# Patient Record
Sex: MALE | Race: WHITE | URBAN - METROPOLITAN AREA
[De-identification: names, ages, dates, MRNs, and addresses within clinical notes are randomized per-mention and may not be internally consistent; named-entity substitution may affect disease eponyms.]

---

## 2017-12-18 ENCOUNTER — OFFICE VISIT (OUTPATIENT)
Dept: OPHTHALMOLOGY | Facility: CLINIC | Age: 27
End: 2017-12-18

## 2017-12-18 DIAGNOSIS — H52.03 HYPEROPIA OF BOTH EYES WITH ASTIGMATISM: ICD-10-CM

## 2017-12-18 DIAGNOSIS — H50.00 ESOTROPIA OF LEFT EYE: Primary | ICD-10-CM

## 2017-12-18 DIAGNOSIS — H52.203 HYPEROPIA OF BOTH EYES WITH ASTIGMATISM: ICD-10-CM

## 2017-12-18 DIAGNOSIS — H02.402 PTOSIS, LEFT EYELID: ICD-10-CM

## 2017-12-18 DIAGNOSIS — H53.002 AMBLYOPIA, LEFT EYE: ICD-10-CM

## 2017-12-18 PROCEDURE — 92014 COMPRE OPH EXAM EST PT 1/>: CPT | Performed by: OPHTHALMOLOGY

## 2017-12-18 PROCEDURE — 92015 DETERMINE REFRACTIVE STATE: CPT | Performed by: OPHTHALMOLOGY

## 2017-12-18 NOTE — PROGRESS NOTES
Monica Wynn is a 31year old male. HPI:     HPI     EP/ almost 28 yr old here for a complete exam. LDE 4/9/15; last seen on 12/21/15 with Hx of esotropia OS, hyperopia, astigmatism, amblyopia OS, ptosis ROGELIO and blepharitis.  Pt needs a vision form f Bunny Synephrine @ 10:59 AM            Strabismus Exam         LET 16                      Slit Lamp and Fundus Exam     External Exam       Right Left    External Normal Normal          Slit Lamp Exam       Right Left    Lids/Lashes Normal Ptosis, Blepharit

## 2017-12-18 NOTE — PATIENT INSTRUCTIONS
Hyperopia of both eyes with astigmatism  New glasses given. Amblyopia, left eye  Longstanding; no treatment . Continue glasses full time. Ptosis, left eyelid  No treatment. Esotropia of left eye  Stable; no treatment.   Vision report for mis

## 2018-12-21 ENCOUNTER — OFFICE VISIT (OUTPATIENT)
Dept: OPHTHALMOLOGY | Facility: CLINIC | Age: 28
End: 2018-12-21
Payer: MEDICARE

## 2018-12-21 DIAGNOSIS — H01.001 BLEPHARITIS OF UPPER EYELIDS OF BOTH EYES, UNSPECIFIED TYPE: ICD-10-CM

## 2018-12-21 DIAGNOSIS — H01.004 BLEPHARITIS OF UPPER EYELIDS OF BOTH EYES, UNSPECIFIED TYPE: ICD-10-CM

## 2018-12-21 DIAGNOSIS — H50.00 ESOTROPIA OF LEFT EYE: ICD-10-CM

## 2018-12-21 DIAGNOSIS — H52.03 HYPEROPIA OF BOTH EYES WITH ASTIGMATISM: ICD-10-CM

## 2018-12-21 DIAGNOSIS — H52.203 HYPEROPIA OF BOTH EYES WITH ASTIGMATISM: ICD-10-CM

## 2018-12-21 DIAGNOSIS — H02.402 PTOSIS, LEFT EYELID: Primary | ICD-10-CM

## 2018-12-21 PROCEDURE — 92014 COMPRE OPH EXAM EST PT 1/>: CPT | Performed by: OPHTHALMOLOGY

## 2018-12-21 PROCEDURE — 92015 DETERMINE REFRACTIVE STATE: CPT | Performed by: OPHTHALMOLOGY

## 2018-12-21 NOTE — PATIENT INSTRUCTIONS
Ptosis, left eyelid  Stable    Blepharitis, both eyes  Patient instructed to use lid hygiene  daily. Apply baby shampoo to warm washcloth and scrub eyelids gently with eyes closed, then rinse thoroughly.         Hyperopia of both eyes with astigmatism  New

## 2018-12-21 NOTE — ASSESSMENT & PLAN NOTE
Patient instructed to use lid hygiene  daily. Apply baby shampoo to warm washcloth and scrub eyelids gently with eyes closed, then rinse thoroughly.

## 2019-12-30 ENCOUNTER — OFFICE VISIT (OUTPATIENT)
Dept: OPHTHALMOLOGY | Facility: CLINIC | Age: 29
End: 2019-12-30
Payer: MEDICARE

## 2019-12-30 DIAGNOSIS — H52.03 HYPEROPIA OF BOTH EYES WITH ASTIGMATISM: ICD-10-CM

## 2019-12-30 DIAGNOSIS — H52.203 HYPEROPIA OF BOTH EYES WITH ASTIGMATISM: ICD-10-CM

## 2019-12-30 DIAGNOSIS — H50.00 ESOTROPIA OF LEFT EYE: ICD-10-CM

## 2019-12-30 DIAGNOSIS — H53.002 AMBLYOPIA, LEFT EYE: ICD-10-CM

## 2019-12-30 DIAGNOSIS — H02.402 PTOSIS, LEFT EYELID: Primary | ICD-10-CM

## 2019-12-30 DIAGNOSIS — H01.001 BLEPHARITIS OF UPPER EYELIDS OF BOTH EYES, UNSPECIFIED TYPE: ICD-10-CM

## 2019-12-30 DIAGNOSIS — H01.004 BLEPHARITIS OF UPPER EYELIDS OF BOTH EYES, UNSPECIFIED TYPE: ICD-10-CM

## 2019-12-30 PROCEDURE — 92015 DETERMINE REFRACTIVE STATE: CPT | Performed by: OPHTHALMOLOGY

## 2019-12-30 PROCEDURE — 92014 COMPRE OPH EXAM EST PT 1/>: CPT | Performed by: OPHTHALMOLOGY

## 2019-12-30 NOTE — PATIENT INSTRUCTIONS
Ptosis, left eyelid  Mild, stable. Hyperopia of both eyes with astigmatism  New glasses    Blepharitis, both eyes  Patient instructed to use lid hygiene  daily.   Apply baby shampoo to warm washcloth and scrub eyelids gently with eyes closed, then rinse

## 2019-12-30 NOTE — PROGRESS NOTES
Jazz Huston is a 26year old male. HPI:     HPI     EP/ 26 yr old here for a complete exam. LDE was 12/21/18 with Hx of Hx of esotropia OS, hyperopia, astigmatism, amblyopia OS, ptosis ROGELIO and blepharitis. History of cranial synostosis.  Had cranial Dilation     Both eyes:  1.0% Mydriacyl and 2.5% Bunny Synephrine @ 12:09 PM            Strabismus Exam     Distance Near Near +3DS N Bifocals    LET  LET'                Slit Lamp and Fundus Exam     External Exam       Right Left    External Normal Normal

## 2020-11-23 ENCOUNTER — TELEPHONE (OUTPATIENT)
Dept: OPHTHALMOLOGY | Facility: CLINIC | Age: 30
End: 2020-11-23

## 2020-11-23 NOTE — TELEPHONE ENCOUNTER
Pt has to go back to [id number] [facility] on 1/15/21 - and wont be able to leave after that - asking for appt before 1/15

## 2020-12-11 ENCOUNTER — OFFICE VISIT (OUTPATIENT)
Dept: OPHTHALMOLOGY | Facility: CLINIC | Age: 30
End: 2020-12-11
Payer: MEDICARE

## 2020-12-11 DIAGNOSIS — H52.203 HYPEROPIA OF BOTH EYES WITH ASTIGMATISM: ICD-10-CM

## 2020-12-11 DIAGNOSIS — H02.402 PTOSIS, LEFT EYELID: Primary | ICD-10-CM

## 2020-12-11 DIAGNOSIS — H50.00 ESOTROPIA OF LEFT EYE: ICD-10-CM

## 2020-12-11 DIAGNOSIS — H53.002 AMBLYOPIA, LEFT EYE: ICD-10-CM

## 2020-12-11 DIAGNOSIS — H52.03 HYPEROPIA OF BOTH EYES WITH ASTIGMATISM: ICD-10-CM

## 2020-12-11 PROCEDURE — 92015 DETERMINE REFRACTIVE STATE: CPT | Performed by: OPHTHALMOLOGY

## 2020-12-11 PROCEDURE — 92014 COMPRE OPH EXAM EST PT 1/>: CPT | Performed by: OPHTHALMOLOGY

## 2020-12-11 NOTE — PATIENT INSTRUCTIONS
Hyperopia of both eyes with astigmatism  New glasses    Amblyopia, left eye  Mild, stable. Ptosis, left eyelid  Will follow    Esotropia of left eye  Small angle.  stable

## 2020-12-11 NOTE — PROGRESS NOTES
Roxane Russell is a 26year old male. HPI:     HPI     EP/ 26 yr old here for a complete exam. LDE 12/30/19 with Hx of ROGELIO ptosis, hyperopia, astigmatism, esotropia, blepharitis and left amblyopia.  Pt denies any blurred vision at distance or near with 2.5% Bunny Synephrine @ 1:10PM            Strabismus Exam     Distance Near Near +3DS N Bifocals     LET'        RHT'         Slit Lamp and Fundus Exam     External Exam       Right Left    External Normal Normal          Slit Lamp Exam       Right Left    L

## 2021-12-17 ENCOUNTER — OFFICE VISIT (OUTPATIENT)
Dept: OPHTHALMOLOGY | Facility: CLINIC | Age: 31
End: 2021-12-17
Payer: MEDICARE

## 2021-12-17 DIAGNOSIS — H50.21 HYPERTROPIA OF RIGHT EYE: ICD-10-CM

## 2021-12-17 DIAGNOSIS — H50.00 ESOTROPIA OF LEFT EYE: ICD-10-CM

## 2021-12-17 DIAGNOSIS — H52.12 MYOPIA, LEFT EYE: ICD-10-CM

## 2021-12-17 DIAGNOSIS — H52.223 REGULAR ASTIGMATISM OF BOTH EYES: ICD-10-CM

## 2021-12-17 DIAGNOSIS — H52.01 HYPEROPIA OF RIGHT EYE: ICD-10-CM

## 2021-12-17 DIAGNOSIS — H02.402 PTOSIS, LEFT EYELID: Primary | ICD-10-CM

## 2021-12-17 PROCEDURE — 92015 DETERMINE REFRACTIVE STATE: CPT | Performed by: OPHTHALMOLOGY

## 2021-12-17 PROCEDURE — 92014 COMPRE OPH EXAM EST PT 1/>: CPT | Performed by: OPHTHALMOLOGY

## 2021-12-17 NOTE — PROGRESS NOTES
Sneha Mole is a 31year old male. HPI:     HPI     EP/ 31year old here for a complete exam.  LDE was 12/11/20 with a history of hyperopia with astigmatism OU, amblyopia OS, ptosis OS and esotropia OS. History of Craniosynostosis.  Cranial surgery Right Left     Full Full          Dilation     Both eyes: 1.0% Mydriacyl and 2.5% Bunny Synephrine @ 10:46 AM            Additional Tests     Fusional Vergence     Near point of convergence:  To the nose            Strabismus Exam     Distance Near Near Fred Rios MD

## 2021-12-17 NOTE — PATIENT INSTRUCTIONS
Esotropia of left eye  Small angle. stable    Ptosis, left eyelid  Fairly stable. Slightly more ptosis Left upper lid. Will follow.     Regular astigmatism of both eyes  New glasses    Hyperopia of right eye  New glasses    Amblyopia, left eye  Mild, stabl

## 2023-01-27 ENCOUNTER — OFFICE VISIT (OUTPATIENT)
Dept: OPHTHALMOLOGY | Facility: CLINIC | Age: 33
End: 2023-01-27
Payer: MEDICARE

## 2023-01-27 DIAGNOSIS — Q75.0 CRANIOSYNOSTOSIS: ICD-10-CM

## 2023-01-27 DIAGNOSIS — H52.01 HYPEROPIA OF RIGHT EYE: ICD-10-CM

## 2023-01-27 DIAGNOSIS — H52.12 MYOPIA, LEFT EYE: ICD-10-CM

## 2023-01-27 DIAGNOSIS — H52.223 REGULAR ASTIGMATISM OF BOTH EYES: ICD-10-CM

## 2023-01-27 DIAGNOSIS — H50.012 ESOTROPIA OF LEFT EYE: ICD-10-CM

## 2023-01-27 DIAGNOSIS — H53.002 AMBLYOPIA, LEFT EYE: ICD-10-CM

## 2023-01-27 DIAGNOSIS — H50.22 HYPOTROPIA OF LEFT EYE: ICD-10-CM

## 2023-01-27 DIAGNOSIS — H02.403 PTOSIS, BOTH EYELIDS: Primary | ICD-10-CM

## 2023-01-27 PROBLEM — Q75.009 CRANIOSYNOSTOSIS: Status: ACTIVE | Noted: 2023-01-27

## 2023-01-27 PROBLEM — H50.21 HYPERTROPIA OF RIGHT EYE: Status: RESOLVED | Noted: 2021-12-17 | Resolved: 2023-01-27

## 2023-01-27 PROCEDURE — 92015 DETERMINE REFRACTIVE STATE: CPT | Performed by: OPHTHALMOLOGY

## 2023-01-27 PROCEDURE — 92060 SENSORIMOTOR EXAMINATION: CPT | Performed by: OPHTHALMOLOGY

## 2023-01-27 PROCEDURE — 92014 COMPRE OPH EXAM EST PT 1/>: CPT | Performed by: OPHTHALMOLOGY

## 2023-01-27 NOTE — PATIENT INSTRUCTIONS
Ptosis, both eyelids  Consider further surgery. Slightly more ptosis Left upper lid. Esotropia of left eye  Small angle. Stable. Craniosynostosis  History of craniosynostosis. Had surgery x 2 as an Infant at [facility]. Myopia, left eye  New glasses. Slight change. Hyperopia of right eye  New glasses. Slight change. Hypotropia of left eye  Small angle. Stable. Regular astigmatism of both eyes  New glasses. Slight change.

## 2024-03-08 ENCOUNTER — OFFICE VISIT (OUTPATIENT)
Dept: OPHTHALMOLOGY | Facility: CLINIC | Age: 34
End: 2024-03-08

## 2024-03-08 DIAGNOSIS — H52.12 MYOPIA, LEFT EYE: ICD-10-CM

## 2024-03-08 DIAGNOSIS — H50.22 HYPOTROPIA OF LEFT EYE: ICD-10-CM

## 2024-03-08 DIAGNOSIS — H02.403 PTOSIS, BOTH EYELIDS: ICD-10-CM

## 2024-03-08 DIAGNOSIS — H01.021 SQUAMOUS BLEPHARITIS OF UPPER EYELIDS OF BOTH EYES: ICD-10-CM

## 2024-03-08 DIAGNOSIS — H53.002 AMBLYOPIA, LEFT EYE: ICD-10-CM

## 2024-03-08 DIAGNOSIS — H50.012 ESOTROPIA OF LEFT EYE: ICD-10-CM

## 2024-03-08 DIAGNOSIS — H01.024 SQUAMOUS BLEPHARITIS OF UPPER EYELIDS OF BOTH EYES: ICD-10-CM

## 2024-03-08 DIAGNOSIS — H52.223 REGULAR ASTIGMATISM OF BOTH EYES: Primary | ICD-10-CM

## 2024-03-08 NOTE — PATIENT INSTRUCTIONS
Blepharitis, both eyes  Patient instructed to use lid hygiene  daily.  Apply baby shampoo to warm washcloth and cleanse eyelids gently with eyes closed, then rinse thoroughly.        Amblyopia, left eye  Mild, stable.    Regular astigmatism of both eyes  New glasses. Slight change.    Myopia, left eye  New glasses. Slight change.    Ptosis, both eyelids  Stable. Slightly more ptosis Left upper lid.      Hypotropia of left eye  Small angle. Stable.    Esotropia of left eye  Small angle. Stable. History of Eye muscle surgery at [facility] in 1991 for Congenital Esotropia.

## 2024-03-08 NOTE — ASSESSMENT & PLAN NOTE
Patient instructed to use lid hygiene  daily.  Apply baby shampoo to warm washcloth and cleanse eyelids gently with eyes closed, then rinse thoroughly.

## 2024-03-10 NOTE — ASSESSMENT & PLAN NOTE
Small angle. Stable. History of Eye muscle surgery at [facility] in 1991 for Congenital Esotropia.

## 2024-03-10 NOTE — PROGRESS NOTES
Donal Stern is a 33 year old male.    HPI:     HPI    EP 33 year old M LDE 1/27/23 , He is here for complete eye exam.   He has Ptosis slightly more left upper lid on last exam but patient and family feel it is stable both eyes.    Esotropia left eye, small angle S/P muscle surgery at 1 years old.  Craniosynostosis, S/P cranial surgery as an infant  Myopia left eye and Hypotropia with astigmatism left eye  Hyperopia right eye.  PMH: History of Craniosynostosis. Cranial surgery as an infant.  Ptosis surgery on Left lid at age 1 in 1991 at [facility]. Eye muscle surgery for Congenital Esotropia in 1991 at [facility].      Pt states vision seems good with his glasses on.      Last edited by Ktaty Mccormick MD on 3/10/2024  1:42 PM.        Patient History:  Past Medical History:   Diagnosis Date    Amblyopia, left eye 4/6/2015    Esotropia 2009    Esotropia of left eye 4/6/2015    Hyperopia of both eyes with astigmatism 4/6/2015    Hyperopia with astigmatism 2006    Other ill-defined conditions(799.89) 2009    Hypertropia    Ptosis, congenital 2006    Ptosis surgery - 1991    Ptosis, left eyelid 4/6/2015    Synostosis (cranial)     per NG; Cranio synostosis - cranial surgery as an infant       Surgical History: Donal Stern has no past surgical history on file.    Family History   Problem Relation Age of Onset    Glaucoma Neg     Diabetes Neg     Macular degeneration Neg        Social History:   Social History     Socioeconomic History    Marital status: Single   Tobacco Use    Smoking status: Never    Smokeless tobacco: Never   Substance and Sexual Activity    Alcohol use: No    Drug use: No   Other Topics Concern    Caffeine Concern No       Medications:  Current Outpatient Medications   Medication Sig Dispense Refill    SEROQUEL  MG Oral Tablet 24 Hr       FLUoxetine HCl (PROZAC) 20 MG Oral Tab   2       Allergies:  No Known Allergies    ROS:     ROS    Positive for: Eyes  Last edited by Divine Olguin,  O.T. on 3/8/2024 10:30 AM.          PHYSICAL EXAM:     Base Eye Exam       Visual Acuity (Snellen - Linear)         Right Left    Dist cc 20/30 20/40    Dist ph cc NI NI    Near cc 20/20 20/20      Correction: Glasses              Tonometry ([facility], 10:50 AM)         Right Left    Pressure 13 13              Pupils         Pupils APD    Right PERRL None    Left PERRL None              Visual Fields (Counting fingers)         Left Right     Full Full              Extraocular Movement         Right Left     Full Full              Dilation       Both eyes: 1.0% Mydriacyl and 2.5% Bunny Synephrine @ 10:50 AM                  Additional Tests       Fusional Vergence       Near point of convergence: To the nose                  Strabismus Exam       Correction: cc      Distance Near Near +3DS N Bifocals    LET 6 LET' 6      LHypoT  LHypoT'             Slit Lamp and Fundus Exam       External Exam         Right Left    External Normal Normal              Slit Lamp Exam         Right Left    Lids/Lashes Ptosis, Blepharitis MRD1  1mm Ptosis, Blepharitis MRD1 1mm    Conjunctiva/Sclera Normal Normal    Cornea Clear Clear    Anterior Chamber Deep and quiet Deep and quiet    Iris Normal Normal    Lens Clear Clear    Vitreous Clear Clear              Fundus Exam         Right Left    Disc pigment nasally around disc Normal    C/D Ratio 0.2 0.2    Macula Normal Normal    Vessels Normal Normal    Periphery Normal Normal                  Refraction       Wearing Rx         Sphere Cylinder Axis    Right +0.50 +2.00 165    Left -0.50 +2.00 015      Age: 1yr    Type: Single vision              Cycloplegic Refraction         Sphere Cylinder [place]    Right [place] +2.00 165 20/20    Left -0.50 +2.00 015 20/30              Final Rx         Sphere Cylinder [place] Dist [place]    Right [place] +2.00 165 20/20    Left -0.50 +2.00 015 20/30                     ASSESSMENT/PLAN:     Diagnoses and Plan:     Blepharitis, both eyes  Patient instructed to  use lid hygiene  daily.  Apply baby shampoo to warm washcloth and cleanse eyelids gently with eyes closed, then rinse thoroughly.        Amblyopia, left eye  Mild, stable.    Regular astigmatism of both eyes  New glasses. Slight change.    Myopia, left eye  New glasses. Slight change.    Ptosis, both eyelids  Stable. Slightly more ptosis Left upper lid.      Hypotropia of left eye  Small angle. Stable.    Esotropia of left eye  Small angle. Stable. History of Eye muscle surgery at [facility] in 1991 for Congenital Esotropia.    No orders of the defined types were placed in this encounter.      Meds This Visit:  Requested Prescriptions      No prescriptions requested or ordered in this encounter        Follow up instructions:  Return in about 1 year (around 3/8/2025), or if symptoms worsen or fail to improve, for Complete exam.    3/10/2024  Scribed by: Katty Mccormick MD